# Patient Record
Sex: MALE | Race: WHITE | NOT HISPANIC OR LATINO | Employment: FULL TIME | URBAN - METROPOLITAN AREA
[De-identification: names, ages, dates, MRNs, and addresses within clinical notes are randomized per-mention and may not be internally consistent; named-entity substitution may affect disease eponyms.]

---

## 2019-12-19 ENCOUNTER — HOSPITAL ENCOUNTER (EMERGENCY)
Facility: HOSPITAL | Age: 24
Discharge: HOME | End: 2019-12-19
Admitting: EMERGENCY MEDICINE

## 2019-12-19 VITALS
TEMPERATURE: 98.2 F | WEIGHT: 315 LBS | OXYGEN SATURATION: 98 % | SYSTOLIC BLOOD PRESSURE: 147 MMHG | RESPIRATION RATE: 18 BRPM | DIASTOLIC BLOOD PRESSURE: 90 MMHG | HEIGHT: 74 IN | BODY MASS INDEX: 40.43 KG/M2 | HEART RATE: 98 BPM

## 2019-12-19 DIAGNOSIS — L02.91 ABSCESS: Primary | ICD-10-CM

## 2019-12-19 DIAGNOSIS — L03.314 CELLULITIS OF GROIN: ICD-10-CM

## 2019-12-19 PROCEDURE — 99283 EMERGENCY DEPT VISIT LOW MDM: CPT

## 2019-12-19 RX ORDER — SULFAMETHOXAZOLE AND TRIMETHOPRIM 800; 160 MG/1; MG/1
1 TABLET ORAL 2 TIMES DAILY
Qty: 20 TABLET | Refills: 0 | Status: SHIPPED | OUTPATIENT
Start: 2019-12-19

## 2019-12-19 RX ORDER — CEPHALEXIN 500 MG/1
500 CAPSULE ORAL 3 TIMES DAILY
Qty: 30 CAPSULE | Refills: 0 | Status: SHIPPED | OUTPATIENT
Start: 2019-12-19

## 2019-12-19 RX ORDER — NAPROXEN 500 MG/1
500 TABLET ORAL 2 TIMES DAILY WITH MEALS
Qty: 20 TABLET | Refills: 0 | Status: SHIPPED | OUTPATIENT
Start: 2019-12-19

## 2019-12-19 RX ORDER — HYDROCODONE BITARTRATE AND ACETAMINOPHEN 5; 325 MG/1; MG/1
1 TABLET ORAL ONCE AS NEEDED
Status: DISCONTINUED | OUTPATIENT
Start: 2019-12-19 | End: 2019-12-20 | Stop reason: HOSPADM

## 2019-12-19 RX ADMIN — HYDROCODONE BITARTRATE AND ACETAMINOPHEN 1 TABLET: 5; 325 TABLET ORAL at 22:26

## 2019-12-20 NOTE — ED PROVIDER NOTES
Subjective      used: Yes      Patient is a 24-year-old male [facility] significant for deafness who does sign language.  Presents with abdominal abscess for the past 3 days.  Patient states is in his left lower abdomen near his groin.  He denies any drainage from the area.  Reports some tenderness to palpation to the region.  Denies any nausea vomiting fever recent antibiotic use.  States is taken no medications for symptoms.  Denies any penile discharge swelling or pain.  Denies any testicular swelling or pain  Review of Systems   Constitutional: Negative.    Respiratory: Negative.    Cardiovascular: Negative.    Gastrointestinal: Negative for abdominal pain, diarrhea, nausea and vomiting.   Genitourinary: Negative.  Negative for discharge, penile pain, penile swelling, scrotal swelling and testicular pain.   Skin: Positive for color change and wound.   Neurological: Negative.        History reviewed. No pertinent past medical history.    No Known Allergies    History reviewed. No pertinent surgical history.    No family history on file.    Social History     Socioeconomic History   • Marital status: Single     Spouse name: Not on file   • Number of children: Not on file   • Years of education: Not on file   • Highest education level: Not on file           Objective   Physical Exam   Constitutional: He is oriented to person, place, and time. He appears well-developed and well-nourished. No distress.   HENT:   Head: Normocephalic and atraumatic.   Eyes: Pupils are equal, round, and reactive to light. EOM are normal. No scleral icterus.   Cardiovascular: Normal rate, regular rhythm and normal heart sounds. Exam reveals no friction rub.   No murmur heard.  Pulmonary/Chest: Effort normal and breath sounds normal. No stridor. He has no wheezes. He has no rales.   Abdominal: Soft. Bowel sounds are normal. He exhibits no distension. There is tenderness. There is no rebound and no guarding. No hernia.  "  Genitourinary:         Neurological: He is alert and oriented to person, place, and time. No cranial nerve deficit or sensory deficit.   Skin: Skin is warm. Capillary refill takes less than 2 seconds. He is not diaphoretic. There is erythema.   Psychiatric: He has a normal mood and affect. His behavior is normal.   Nursing note and vitals reviewed.      Procedures           ED Course    /90 (BP Location: Left arm, Patient Position: Sitting)   Pulse 98   Temp 98.2 °F (36.8 °C) (Oral)   Resp 18   Ht 188 cm (74\")   Wt (!) 158 kg (348 lb 5.2 oz)   SpO2 98%   BMI 44.72 kg/m²   Medications   HYDROcodone-acetaminophen (NORCO) 5-325 MG per tablet 1 tablet (1 tablet Oral Given 12/19/19 2226)                       No data recorded                        MDM  Number of Diagnoses or Management Options  Abscess:   Cellulitis of groin:   Diagnosis management comments: Chart Review:  Comorbidity: Deafness  Differentials: Abscess, cellulitis, lymphadenitis,minh gangrene     ;this list is not all inclusive and does not constitute the entirety of considered causes  ECG: Not warranted  Labs: Not warranted  Imaging: Was interpreted by physician and reviewed by myself: Not warranted  Disposition/Treatment:  While in the ED patient was afebrile he appeared nontoxic.  There is no central fluctuant noted to the region therefore I&D was not performed at this time.  Patient was given Norco for pain.  Findings were discussed with patient and family at bedside voiced understanding of discharge and with signs and symptoms return the ED.  Patient be stated on Keflex and Bactrim he also be given naproxen and a work note for home.      Final diagnoses:   Abscess   Cellulitis of groin              Catracho Rivers PA  12/19/19 0855    "

## 2019-12-20 NOTE — DISCHARGE INSTRUCTIONS
Take antibiotics as prescribed.  Be sure to take full course.  Consider taking probiotic or eating yogurt daily while on antibiotic and up to 10 days after to replace the good bacteria in your gastrointestinal tract; this can reduce chances of developing a GI infection such as C difficile    Take naproxen as needed for pain.  Do not mix with other NSAIDs such as ibuprofen diclofenac or Aleve.    Apply hot compress to area for 20 minutes at a time.    Return to ED for any new or worsening symptoms    Follow-up with your primary care provider in 3-5 days.  If you do not have a primary care provider call [phone] for help in finding one, or you may follow up with [facility] at [phone].

## 2025-06-13 ENCOUNTER — APPOINTMENT (OUTPATIENT)
Dept: GENERAL RADIOLOGY | Facility: HOSPITAL | Age: 30
End: 2025-06-13
Payer: MEDICARE

## 2025-06-13 ENCOUNTER — HOSPITAL ENCOUNTER (OUTPATIENT)
Facility: HOSPITAL | Age: 30
Discharge: HOME | End: 2025-06-13
Attending: EMERGENCY MEDICINE
Payer: MEDICARE

## 2025-06-13 VITALS
DIASTOLIC BLOOD PRESSURE: 95 MMHG | HEIGHT: 74 IN | BODY MASS INDEX: 35.94 KG/M2 | HEART RATE: 84 BPM | OXYGEN SATURATION: 98 % | TEMPERATURE: 98.1 F | SYSTOLIC BLOOD PRESSURE: 163 MMHG | RESPIRATION RATE: 18 BRPM | WEIGHT: 280 LBS

## 2025-06-13 DIAGNOSIS — M79.674 TOE PAIN, RIGHT: Primary | ICD-10-CM

## 2025-06-13 PROCEDURE — 99202 OFFICE O/P NEW SF 15 MIN: CPT

## 2025-06-13 PROCEDURE — 73660 X-RAY EXAM OF TOE(S): CPT

## 2025-06-13 PROCEDURE — G0463 HOSPITAL OUTPT CLINIC VISIT: HCPCS

## 2025-06-13 NOTE — DISCHARGE INSTRUCTIONS
You can continue to apply ice to the area discomfort 20-minute increments several times a day.    Follow-up with podiatry if symptoms persist.    Follow-up with primary care as needed.    Return to the ER with any new or worsening symptoms.

## 2025-06-13 NOTE — FSED PROVIDER NOTE
Patient is a 30-year-old male who presents today with Subjective   History of Present Illness  Patient is a 30-year-old male who presents today with right toe pain that began yesterday after stubbing.  He denies numbness, tingling or loss of sensation.        Review of Systems    No past medical history on file.    No Known Allergies    No past surgical history on file.    No family history on file.    Social History     Socioeconomic History    Marital status: Single           Objective   Physical Exam  Vitals and nursing note reviewed.   Constitutional:       Appearance: Normal appearance.   HENT:      Head: Normocephalic and atraumatic.      Nose: Nose normal.      Mouth/Throat:      Mouth: Mucous membranes are moist.   Eyes:      Conjunctiva/sclera: Conjunctivae normal.   Cardiovascular:      Rate and Rhythm: Normal rate and regular rhythm.      Pulses: Normal pulses.      Heart sounds: Normal heart sounds.   Pulmonary:      Effort: Pulmonary effort is normal.      Breath sounds: Normal breath sounds.   Musculoskeletal:         General: No swelling, tenderness or signs of injury. Normal range of motion.      Cervical back: Normal range of motion.   Skin:     General: Skin is warm.      Capillary Refill: Capillary refill takes less than 2 seconds.   Neurological:      General: No focal deficit present.      Mental Status: He is alert.   Psychiatric:         Mood and Affect: Mood normal.         Behavior: Behavior normal.         Thought Content: Thought content normal.         Judgment: Judgment normal.         Procedures           ED Course                                           Medical Decision Making  Exam was obtained via iPad using an .     Patient is a 30-year-old male who presents today with right toe pain that began yesterday after stubbing.  He denies numbness, tingling or loss of sensation.    Upon exam patient is awake and alert, nontoxic-appearing, appears in no acute distress, and  is answering questions appropriately.  Lung sounds are clear and equal bilaterally.  Heart is normal rate and rhythm.  Mucous membranes are moist.  Patient reports right toe pain.  There is no edema, erythema, bruising, or signs of trauma present.  An x-ray was obtained and showed nothing acute.  We discussed return precautions.  I advised him to follow-up podiatry if symptoms persist. I gave him a work note upon request.  I advised him return to the ER with any new or worsening symptoms.    Problems Addressed:  Toe pain, right: complicated acute illness or injury    Amount and/or Complexity of Data Reviewed  Radiology: ordered.        Final diagnoses:   Toe pain, right       ED Disposition  ED Disposition       ED Disposition   Discharge    Condition   Stable    Comment   --               VALERIANO Ayala DPM  [address] [address]  [phone]    Schedule an appointment as soon as possible for a visit in 1 day      Dex Cuadra DPM  [address] [facility] [address] [phone]    Schedule an appointment as soon as possible for a visit in 1 day           Medication List        Stop      cephalexin 500 MG capsule  Commonly known as: KEFLEX     sulfamethoxazole-trimethoprim 800-160 MG per tablet  Commonly known as: BACTRIM DS,SEPTRA DS

## 2025-06-13 NOTE — Clinical Note
[facility] [address] [place] [address] [place] [address]  Phone: [phone]    Yang Haley was seen and treated in our emergency department on 6/13/2025.  He may return to work on 06/14/2025.         Thank you for choosing [facility].    Robert Woodard RN

## 2025-07-22 ENCOUNTER — HOSPITAL ENCOUNTER (OUTPATIENT)
Facility: HOSPITAL | Age: 30
Discharge: HOME | End: 2025-07-22
Attending: EMERGENCY MEDICINE | Admitting: EMERGENCY MEDICINE
Payer: MEDICARE

## 2025-07-22 VITALS
WEIGHT: 315 LBS | HEART RATE: 87 BPM | HEIGHT: 74 IN | RESPIRATION RATE: 16 BRPM | TEMPERATURE: 98.3 F | SYSTOLIC BLOOD PRESSURE: 179 MMHG | DIASTOLIC BLOOD PRESSURE: 93 MMHG | OXYGEN SATURATION: 94 % | BODY MASS INDEX: 40.43 KG/M2

## 2025-07-22 DIAGNOSIS — J06.9 VIRAL URI: Primary | ICD-10-CM

## 2025-07-22 DIAGNOSIS — R09.81 SINUS CONGESTION: ICD-10-CM

## 2025-07-22 LAB
FLUAV SUBTYP SPEC NAA+PROBE: NOT DETECTED
FLUBV RNA NPH QL NAA+NON-PROBE: NOT DETECTED
SARS-COV-2 RNA RESP QL NAA+PROBE: NOT DETECTED

## 2025-07-22 PROCEDURE — G0463 HOSPITAL OUTPT CLINIC VISIT: HCPCS

## 2025-07-22 PROCEDURE — 87636 SARSCOV2 & INF A&B AMP PRB: CPT | Performed by: EMERGENCY MEDICINE

## 2025-07-22 RX ORDER — CETIRIZINE HYDROCHLORIDE, PSEUDOEPHEDRINE HYDROCHLORIDE 5; 120 MG/1; MG/1
1 TABLET, FILM COATED, EXTENDED RELEASE ORAL 2 TIMES DAILY
Qty: 14 TABLET | Refills: 0 | Status: SHIPPED | OUTPATIENT
Start: 2025-07-22 | End: 2025-07-29

## 2025-07-22 RX ORDER — FLUTICASONE PROPIONATE 50 MCG
2 SPRAY, SUSPENSION (ML) NASAL DAILY
Qty: 18.2 G | Refills: 0 | Status: SHIPPED | OUTPATIENT
Start: 2025-07-22

## 2025-07-22 NOTE — DISCHARGE INSTRUCTIONS
Alternate Tylenol and ibuprofen as needed for fever and sinus congestion pain    Begin Flonase nasal steroid spray daily for the next 7 days    Take Zyrtec-D for the next 3 to 5 days to help with nasal congestion    Increase your fluid intake    Follow-up with your family doctor as needed return to ER for worsening symptoms

## 2025-07-22 NOTE — Clinical Note
[facility] [place] [address] [place] [address] [place] [address]  Phone: [phone]    Yang Haley was seen and treated in our emergency department on 7/22/2025.  He may return to work on 07/25/2025.         Thank you for choosing [facility].    Bradley Sexton Jr., ELIZABETH

## 2025-07-22 NOTE — FSED PROVIDER NOTE
Subjective   History of Present Illness  30-year-old male reports a 2-day history of pressure in both of his ears nasal congestion occasional cough.  Denies chest pain shortness of breath vomiting and diarrhea.        Review of Systems   All other systems reviewed and are negative.      No past medical history on file.    No Known Allergies    No past surgical history on file.    No family history on file.    Social History     Socioeconomic History    Marital status: Single           Objective   Physical Exam  Vitals and nursing note reviewed.   Constitutional:       General: He is not in acute distress.     Appearance: Normal appearance. He is not toxic-appearing.   HENT:      Head: Normocephalic and atraumatic.      Ears:      Comments: The right tympanic membrane is reddened and swollen the left TM is normal.    There is a moderate amount of earwax in the left ear canal.    No evidence of otitis externa no evidence of mastoid bone tenderness     Nose: Congestion (Swollen nasal membranes clear congestion) present.      Mouth/Throat:      Mouth: Mucous membranes are moist.   Eyes:      Extraocular Movements: Extraocular movements intact.      Conjunctiva/sclera: Conjunctivae normal.      Pupils: Pupils are equal, round, and reactive to light.   Cardiovascular:      Rate and Rhythm: Normal rate and regular rhythm.      Heart sounds: Normal heart sounds.   Pulmonary:      Effort: Pulmonary effort is normal. No respiratory distress.      Breath sounds: Normal breath sounds. No stridor. No wheezing, rhonchi or rales.   Chest:      Chest wall: No tenderness.   Abdominal:      General: Abdomen is flat. Bowel sounds are normal.      Palpations: Abdomen is soft.   Musculoskeletal:         General: Normal range of motion.      Cervical back: Normal range of motion and neck supple.   Skin:     General: Skin is warm.      Capillary Refill: Capillary refill takes less than 2 seconds.   Neurological:      General: No focal  deficit present.      Mental Status: He is alert and oriented to person, place, and time. Mental status is at baseline.         Procedures           ED Course  ED Course as of 07/22/25 1541   Tue Jul 22, 2025   1420 O2 sat 94% on room air [WF]   1425 COVID and influenza not detected [WF]      ED Course User Index  [WF] Bradley Sexton Jr., APRN                                           Medical Decision Making  COVID flu swab is pending presentation does appear consistent with a viral URI    COVID and flu swabs are negative using the  I was able explained the patient that he most likely has a viral URI.  He is agreeable to discharge home with a work note Flonase and Zyrtec-D.    Problems Addressed:  Sinus congestion: acute illness or injury  Viral URI: acute illness or injury    Risk  OTC drugs.        Final diagnoses:   Viral URI   Sinus congestion       ED Disposition  ED Disposition       ED Disposition   Discharge    Condition   Stable    Comment   --               PATIENT CONNECTION - [place] [id number]  [phone]  Schedule an appointment as soon as possible for a visit   Or follow-up with your primary care provider         Medication List        New Prescriptions      cetirizine-pseudoephedrine 5-120 MG per 12 hr tablet  Commonly known as: ZyrTEC-D  Take 1 tablet by mouth 2 (Two) Times a Day for 7 days.     fluticasone 50 MCG/ACT nasal spray  Commonly known as: FLONASE  Administer 2 sprays into the nostril(s) as directed by provider Daily.               Where to Get Your Medications        These medications were sent to [facility] [id number] - [place], [place] - [address] [place] [address] [phone] [facility] [phone] [address] [place] [address]      Phone: [phone]   cetirizine-pseudoephedrine 5-120 MG per 12 hr tablet  fluticasone 50 MCG/ACT nasal spray